# Patient Record
Sex: MALE | Race: WHITE | NOT HISPANIC OR LATINO
[De-identification: names, ages, dates, MRNs, and addresses within clinical notes are randomized per-mention and may not be internally consistent; named-entity substitution may affect disease eponyms.]

---

## 2017-08-24 PROBLEM — L23.7 POISON IVY DERMATITIS: Status: ACTIVE | Noted: 2017-08-24

## 2017-08-24 PROBLEM — F41.1 GAD (GENERALIZED ANXIETY DISORDER): Status: ACTIVE | Noted: 2017-08-24

## 2017-08-24 PROBLEM — M47.816 LUMBAR SPONDYLOSIS: Status: ACTIVE | Noted: 2017-08-24

## 2018-04-24 PROBLEM — L23.7 POISON IVY DERMATITIS: Status: RESOLVED | Noted: 2017-08-24 | Resolved: 2018-04-24

## 2018-05-01 PROBLEM — R09.81 NASAL CONGESTION: Status: ACTIVE | Noted: 2018-05-01

## 2018-07-06 ENCOUNTER — BH HISTORICAL (OUTPATIENT)
Dept: OTHER | Age: 38
End: 2018-07-06

## 2018-08-02 ENCOUNTER — BH HISTORICAL (OUTPATIENT)
Dept: OTHER | Age: 38
End: 2018-08-02

## 2018-08-31 ENCOUNTER — BH HISTORICAL (OUTPATIENT)
Dept: OTHER | Age: 38
End: 2018-08-31

## 2018-10-25 NOTE — ED INITIAL ASSESSMENT (HPI)
Patient presents with multiple symptoms that started around 10 am this morning. He states that he felt like he was having a panic attack and couldn't breathe and fell. He then took an extra xanax and fell asleep.  He is now having dizziness, twitching to le

## 2018-10-26 NOTE — ED PROVIDER NOTES
Patient Seen in: [facility] Emergency Department    History   Patient presents with:  Numbness Weakness (neurologic)    Stated Complaint: took xanax has nerve damage feels facial numbness    HPI    27-year-old male with a history of severe anxiety d abdominal masses.   No peritoneal signs   Extremities: no edema, normal peripheral pulses   Neuro: Alert oriented and nonfocal   Skin: no rashes or nodules         ED Course   Labs Reviewed - No data to display      MDM       I spent time discussing with th

## 2019-08-10 ENCOUNTER — HOSPITAL ENCOUNTER (EMERGENCY)
Facility: HOSPITAL | Age: 39
Discharge: HOME | End: 2019-08-10
Attending: EMERGENCY MEDICINE
Payer: COMMERCIAL

## 2019-08-10 VITALS
BODY MASS INDEX: 30 KG/M2 | TEMPERATURE: 98 F | WEIGHT: 235 LBS | SYSTOLIC BLOOD PRESSURE: 149 MMHG | HEART RATE: 77 BPM | OXYGEN SATURATION: 96 % | RESPIRATION RATE: 18 BRPM | DIASTOLIC BLOOD PRESSURE: 97 MMHG

## 2019-08-10 DIAGNOSIS — G89.29 CHRONIC MIDLINE BACK PAIN, UNSPECIFIED BACK LOCATION: Primary | ICD-10-CM

## 2019-08-10 DIAGNOSIS — M54.9 CHRONIC MIDLINE BACK PAIN, UNSPECIFIED BACK LOCATION: Primary | ICD-10-CM

## 2019-08-10 PROCEDURE — 96372 THER/PROPH/DIAG INJ SC/IM: CPT

## 2019-08-10 PROCEDURE — 99283 EMERGENCY DEPT VISIT LOW MDM: CPT

## 2019-08-10 RX ORDER — HYDROMORPHONE HYDROCHLORIDE 1 MG/ML
1 INJECTION, SOLUTION INTRAMUSCULAR; INTRAVENOUS; SUBCUTANEOUS ONCE
Status: COMPLETED | OUTPATIENT
Start: 2019-08-10 | End: 2019-08-10

## 2019-08-10 RX ORDER — OXYCODONE AND ACETAMINOPHEN 10; 325 MG/1; MG/1
1 TABLET ORAL EVERY 6 HOURS PRN
Qty: 10 TABLET | Refills: 0 | Status: SHIPPED | OUTPATIENT
Start: 2019-08-10 | End: 2019-08-13

## 2019-08-10 RX ORDER — FLUOXETINE 10 MG/1
10 CAPSULE ORAL DAILY
COMMUNITY
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
## 2019-08-11 NOTE — ED NOTES
Upon assessing, patient states that recently his father (who is his primary caregiver) went out of town and patient has been attempting to complete more ADLs on his own.   He states that yesterday upon returning from the grocery store, he had increased acti

## 2019-08-11 NOTE — ED PROVIDER NOTES
Patient Seen in: [facility] Emergency Department    History   Patient presents with:  Back Pain (musculoskeletal)    Stated Complaint: low back pain- Hx of herniated disc, Took Hydrocodone and Flexeril PTA without *    HPI    Patient is a 39-year-ol Exam    CONSTITUTIONAL: Well appearing, in no acute distress  LUNGS: Clear to auscultation bilaterally  CARDIOVASCULAR: Regular rate and rhythm, normal S1-S2  ABDOMINAL: Soft, nontender, nondistended  SKIN: Warm and dry  Neurological: Bilateral hip flexor symptoms. I discussed the case with the patient and they had no questions, complaints, or concerns. Patient felt comfortable going home.         Disposition and Plan     Clinical Impression:  Chronic midline back pain, unspecified back location  (primary

## 2022-08-16 ENCOUNTER — HOSPITAL ENCOUNTER (EMERGENCY)
Facility: HOSPITAL | Age: 42
Discharge: LEFT BEFORE DISCHARGE | End: 2022-08-17
Payer: COMMERCIAL

## (undated) NOTE — ED AVS SNAPSHOT
Arabella Duncan   MRN: [id number]    Department:  [facility] Emergency Department   Date of Visit:  8/10/2019           Disclosure     Insurance plans vary and the physician(s) referred by the ER may not be covered by your plan.  Please contact yo tell this physician (or your personal doctor if your instructions are to return to your personal doctor) about any new or lasting problems. The primary care or specialist physician will see patients referred from the [facility] Emergency Department.  Barry Max

## (undated) NOTE — ED AVS SNAPSHOT
Joyce Garcia   MRN: [id number]    Department:  [facility] Emergency Department   Date of Visit:  10/25/2018           Disclosure     Insurance plans vary and the physician(s) referred by the ER may not be covered by your plan.  Please contact y tell this physician (or your personal doctor if your instructions are to return to your personal doctor) about any new or lasting problems. The primary care or specialist physician will see patients referred from the [facility] Emergency Department.  Yehuda Baker